# Patient Record
Sex: FEMALE | Race: WHITE | NOT HISPANIC OR LATINO | Employment: PART TIME | URBAN - METROPOLITAN AREA
[De-identification: names, ages, dates, MRNs, and addresses within clinical notes are randomized per-mention and may not be internally consistent; named-entity substitution may affect disease eponyms.]

---
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
## 2023-02-24 ENCOUNTER — OFFICE VISIT (OUTPATIENT)
Dept: ENDOCRINOLOGY | Facility: CLINIC | Age: 18
End: 2023-02-24
Payer: COMMERCIAL

## 2023-02-24 VITALS
SYSTOLIC BLOOD PRESSURE: 120 MMHG | WEIGHT: 221.56 LBS | HEART RATE: 86 BPM | HEIGHT: 66 IN | DIASTOLIC BLOOD PRESSURE: 83 MMHG | OXYGEN SATURATION: 99 % | BODY MASS INDEX: 35.61 KG/M2

## 2023-02-24 DIAGNOSIS — E28.2 PCOS (POLYCYSTIC OVARIAN SYNDROME): ICD-10-CM

## 2023-02-24 LAB
ALBUMIN SERPL-MCNC: 3.7 G/DL (ref 3.4–5)
ALP SERPL-CCNC: 90 U/L (ref 40–150)
ALT SERPL W P-5'-P-CCNC: 32 U/L (ref 0–50)
ANION GAP SERPL CALCULATED.3IONS-SCNC: 4 MMOL/L (ref 3–14)
AST SERPL W P-5'-P-CCNC: 12 U/L (ref 0–35)
BILIRUB SERPL-MCNC: 0.7 MG/DL (ref 0.2–1.3)
BUN SERPL-MCNC: 8 MG/DL (ref 7–30)
CALCIUM SERPL-MCNC: 9.8 MG/DL (ref 8.5–10.1)
CHLORIDE BLD-SCNC: 107 MMOL/L (ref 96–110)
CO2 SERPL-SCNC: 28 MMOL/L (ref 20–32)
CREAT SERPL-MCNC: 0.59 MG/DL (ref 0.5–1)
GFR SERPL CREATININE-BSD FRML MDRD: >90 ML/MIN/1.73M2
GLUCOSE BLD-MCNC: 94 MG/DL (ref 70–99)
HBA1C MFR BLD: 5.5 % (ref 0–5.6)
POTASSIUM BLD-SCNC: 4 MMOL/L (ref 3.4–5.3)
PROT SERPL-MCNC: 7.5 G/DL (ref 6.8–8.8)
SODIUM SERPL-SCNC: 139 MMOL/L (ref 133–144)

## 2023-02-24 PROCEDURE — 83036 HEMOGLOBIN GLYCOSYLATED A1C: CPT | Performed by: NURSE PRACTITIONER

## 2023-02-24 PROCEDURE — 80053 COMPREHEN METABOLIC PANEL: CPT | Performed by: NURSE PRACTITIONER

## 2023-02-24 PROCEDURE — 36415 COLL VENOUS BLD VENIPUNCTURE: CPT | Performed by: NURSE PRACTITIONER

## 2023-02-24 PROCEDURE — 99213 OFFICE O/P EST LOW 20 MIN: CPT | Performed by: NURSE PRACTITIONER

## 2023-02-24 RX ORDER — NORGESTIMATE AND ETHINYL ESTRADIOL 0.25-0.035
1 KIT ORAL DAILY
Qty: 63 TABLET | Refills: 3 | Status: SHIPPED | OUTPATIENT
Start: 2023-02-24 | End: 2023-05-25

## 2023-02-24 RX ORDER — NORGESTIMATE AND ETHINYL ESTRADIOL 0.25-0.035
1 KIT ORAL DAILY
Qty: 63 TABLET | Refills: 0 | Status: SHIPPED | OUTPATIENT
Start: 2023-02-24 | End: 2023-02-24

## 2023-02-24 NOTE — PATIENT INSTRUCTIONS
Thank you for choosing [facility]. It was a pleasure to see you for your office visit today.     If you have any questions or scheduling needs during regular office hours, please call: [phone]  If urgent concerns arise after hours, you can call [phone] and ask to speak to the pediatric specialist on call.   If you need to schedule Imaging/Radiology tests, please call: [phone]  [employer] messages are for routine communication and questions and are usually answered within 48-72 hours. If you have an urgent concern or require sooner response, please call us.  Outside lab and imaging results should be faxed to [phone].  If you go to a lab outside of [facility] we will not automatically get those results. You will need to ask to have them faxed.   You may receive a survey regarding your experience with the clinic today. We would appreciate your feedback.   We encourage to you make your follow-up today to ensure a timely appointment. If you are unable to do so please reach out to [phone] as soon as possible.       If you had any blood work, imaging or other tests completed today:  Normal test results will be mailed to your home address in a letter.  Abnormal results will be communicated to you via phone call/letter.  Please allow up to 1-2 weeks for processing and interpretation of most lab work.      I refilled your Ortho-cyclen for the year.  Labs today-A1c and electrolytes.  I recommend establishing care with adult ob/gyn to have them take over management of Honey's PCOS.

## 2023-02-24 NOTE — PROGRESS NOTES
Pediatric Endocrinology Follow-up Consultation    Patient: Honey Brar MRN# [id number]   YOB: 2005 Age: 17year 3month old   Date of Visit: Feb 24, 2023    Dear Dr. Maddie Lau:    I had the pleasure of seeing your patient, Honey Brar in the Pediatric Endocrinology Clinic, [facility], on Feb 24, 2023 for a follow-up consultation of PCOS.           Problem list:     Patient Active Problem List    Diagnosis Date Noted     Chronic thoracic back pain 04/13/2022     Priority: Medium     Ganglion cyst 04/13/2018     Priority: Medium     Elevated TSH 04/13/2018     Priority: Medium            HPI:   Honey is a 17 year old female with in follow up regarding PCOS.  Honey was last seen in endocrine clinic virtually on 5/11/2021.       Previous history is reviewed:  Honey had consultation with Dr. Merrill with dermatology for management of her severe acne 7/2020.  During this consultation concerns with areas of hirsutism on upper lip in addition to her acne were noted with recommendation to seek endocrine evaluation.  Honey had noticed this hair growth but has been relatively unconcerned by it.  Honey denies areas of hirsutism on chin, nipple area, or abdomen.  Her acne had shown much improvement with treatment prescribed by Dr. Merrill.  Honey underwent menarche at age 10.       Work up obtained after our initial consultation 8/18/2020 Honey showed a normal DHEA-S (ruling out concern for adrenal tumor), normal FSH and LH.  Honey's SHBG was low with a elevated free testosterone level.  Her abnormal biochemical markers  combined with hirsutism and  severe acne were concerning for PCOS.  Pelvic ultrasound performed was normal as was androstenedione level to rule out another marker of adrenal tumor.   Honey was recommended to initiate treatment with COCs and Ortho-Cyclen was prescribed.       Current history:  Honey reports being generally well since our last visit in 5/2021.  She has been off  OCPs for approximately the past 9-12 months (she is unclear exactly how long).  Her menses continue to be more regular even off treatment but she is interested in resuming use of Ortho-Cyclen today.  She just started her menses yesterday.   She denies excessive thirst or urination.   Honey denies abdominal pain, diarrhea, or constipation.  Facial hair growth has not been as significant.  Still following with dermatology.      I have reviewed the available past laboratory evaluations, imaging studies, and medical records available to me at this visit. I have reviewed the Honey's growth chart.  Growth is complete.  Weight is up.       History was obtained from patient, patient's mother and electronic health record.    History was obtained from patient, patient's mother and electronic health record.          Social History:     Social History     Social History Narrative    Graduated high school spring 2022.  She is living at home and attending the [employer].       Social history was reviewed.           Family History:     Family History   Problem Relation Age of Onset     Diabetes Type 2  Maternal Grandmother      Hypertension Mother      Depression Mother      Anxiety Disorder Mother        Family history was reviewed and is unchanged. Refer to the initial note.         Allergies:   No Known Allergies          Medications:     Current Outpatient Medications   Medication Sig Dispense Refill     clindamycin (CLINDAMAX) 1 % external gel Apply once daily to face, chest and back.  For additional refills, please schedule a follow-up appointment. 30 g 11     fluocinonide (LIDEX) 0.05 % external solution Apply once daily for 3 weeks, then use 3 times weekly as needed 60 mL 1     ketoconazole (NIZORAL) 2 % external shampoo Apply 5 to 10 mL to wet scalp, lather, leave on 3 to 5 minutes, and rinse; apply twice weekly for 2 to 4 weeks. 120 mL 1     norgestimate-ethinyl estradiol (ORTHO-CYCLEN) 0.25-35 MG-MCG tablet Take 1 tablet  "by mouth daily 63 tablet 3             Review of Systems:   Gen: Negative  Eye: Negative  ENT: Negative  Pulmonary:  Negative  Cardio: Negative  Gastrointestinal: Negative  Hematologic: Negative  Genitourinary: Negative  Musculoskeletal: Negative  Psychiatric: Negative  Neurologic: Negative  Skin: Negative  Endocrine: see HPI.            Physical Exam:   Blood pressure 120/83, pulse 86, height 1.675 m (5' 5.95\"), weight 100.5 kg (221 lb 9 oz), SpO2 99 %, not currently breastfeeding.  Blood pressure percentiles are not available for patients who are 18 years or older.  Height: 167.5 cm   74 %ile (Z= 0.65) based on CDC (Girls, 2-20 Years) Stature-for-age data based on Stature recorded on 2/24/2023.  Weight: 100.5 kg (actual weight), 99 %ile (Z= 2.22) based on CDC (Girls, 2-20 Years) weight-for-age data using vitals from 2/24/2023.  BMI: Body mass index is 35.82 kg/m . 98 %ile (Z= 1.97) based on CDC (Girls, 2-20 Years) BMI-for-age based on BMI available as of 2/24/2023.        Constitutional: awake, alert, cooperative, no apparent distress  Eyes: Lids and lashes normal, sclera clear, conjunctiva normal  ENT: Normocephalic, without obvious abnormality, external ears without lesions,   Neck: Supple, symmetrical, trachea midline, thyroid symmetric, not enlarged and no tenderness  Hematologic / Lymphatic: no cervical lymphadenopathy  Lungs: No increased work of breathing, clear to auscultation bilaterally with good air entry.  Cardiovascular: Regular rate and rhythm, no murmurs.  Abdomen: No scars,soft, non-distended, non-tender, no masses palpated, no hepatosplenomegaly  Musculoskeletal: There is no redness, warmth, or swelling of the joints.    Neurologic: Awake, alert, oriented to name, place and time.  Neuropsychiatric: normal  Skin: no lesions, mild acanthosis to posterior and anterior neck folds        Laboratory results:     Results for orders placed or performed in visit on 02/24/23   Hemoglobin A1c     Status: " Normal   Result Value Ref Range    Hemoglobin A1C 5.5 0.0 - 5.6 %   Comprehensive metabolic panel     Status: Normal   Result Value Ref Range    Sodium 139 133 - 144 mmol/L    Potassium 4.0 3.4 - 5.3 mmol/L    Chloride 107 96 - 110 mmol/L    Carbon Dioxide (CO2) 28 20 - 32 mmol/L    Anion Gap 4 3 - 14 mmol/L    Urea Nitrogen 8 7 - 30 mg/dL    Creatinine 0.59 0.50 - 1.00 mg/dL    Calcium 9.8 8.5 - 10.1 mg/dL    Glucose 94 70 - 99 mg/dL    Alkaline Phosphatase 90 40 - 150 U/L    AST 12 0 - 35 U/L    ALT 32 0 - 50 U/L    Protein Total 7.5 6.8 - 8.8 g/dL    Albumin 3.7 3.4 - 5.0 g/dL    Bilirubin Total 0.7 0.2 - 1.3 mg/dL    GFR Estimate >90 >60 mL/min/1.73m2            Assessment and Plan:   Honey is a 17 year old female with PCOS.    A1c screened today was normal.  Electrolytes including liver function and glucose were normal.  Ortho-cyclen was refilled.     Follow up with adult gynecology/ob is recommended in 1 year.      Orders Placed This Encounter   Procedures     Hemoglobin A1c     Comprehensive metabolic panel       Patient Instructions     Thank you for choosing [facility]. It was a pleasure to see you for your office visit today.     If you have any questions or scheduling needs during regular office hours, please call: [phone]  If urgent concerns arise after hours, you can call [phone] and ask to speak to the pediatric specialist on call.   If you need to schedule Imaging/Radiology tests, please call: [phone]  [employer] messages are for routine communication and questions and are usually answered within 48-72 hours. If you have an urgent concern or require sooner response, please call us.  Outside lab and imaging results should be faxed to [phone].  If you go to a lab outside of [facility] we will not automatically get those results. You will need to ask to have them faxed.   You may receive a survey regarding your experience with the clinic today. We would appreciate your  feedback.   We encourage to you make your follow-up today to ensure a timely appointment. If you are unable to do so please reach out to [phone] as soon as possible.       If you had any blood work, imaging or other tests completed today:  Normal test results will be mailed to your home address in a letter.  Abnormal results will be communicated to you via phone call/letter.  Please allow up to 1-2 weeks for processing and interpretation of most lab work.     1.  I refilled your Ortho-cyclen for the year.  2. Labs today-A1c and electrolytes.  3. I recommend establishing care with adult ob/gyn to have them take over management of Honey's PCOS.       Thank you for allowing me to participate in the care of your patient.  Please do not hesitate to call with questions or concerns.    Sincerely,    IMELDA Zapata, CNP  Pediatric Endocrinology  [facility]  [facility]  [phone]      25 minutes spent on the date of the encounter doing chart review, review of test results, interpretation of tests, patient visit, documentation and discussion with family       CC  Patient Care Team:  Maddie Lau MD (Inactive) as PCP - General (Pediatrics)  Queenie Merrill MD as Assigned Surgical Provider  Gala Fry MD as Assigned PCP  Amanda Forman APRN CNP as Nurse Practitioner (Pediatric Endocrinology)

## 2023-03-06 ENCOUNTER — MYC MEDICAL ADVICE (OUTPATIENT)
Dept: DERMATOLOGY | Facility: CLINIC | Age: 18
End: 2023-03-06

## 2023-03-06 NOTE — TELEPHONE ENCOUNTER
This appointment appears to have been scheduled as an in person Return appointment versus a telephone appointment.  Virtual visit facilitators may not have know to reach out to patient.      Reached out and rescheduled patient to an in-person appointment this Thursday 3/9/2023 at 1:00 pm.

## 2023-04-21 ENCOUNTER — VIRTUAL VISIT (OUTPATIENT)
Dept: DERMATOLOGY | Facility: CLINIC | Age: 18
End: 2023-04-21
Payer: COMMERCIAL

## 2023-04-21 ENCOUNTER — TELEPHONE (OUTPATIENT)
Dept: DERMATOLOGY | Facility: CLINIC | Age: 18
End: 2023-04-21

## 2023-04-21 DIAGNOSIS — L30.9 DERMATITIS: Primary | ICD-10-CM

## 2023-04-21 DIAGNOSIS — L21.0 DANDRUFF IN ADULT: ICD-10-CM

## 2023-04-21 DIAGNOSIS — L70.0 ACNE VULGARIS: ICD-10-CM

## 2023-04-21 DIAGNOSIS — R21 RASH: ICD-10-CM

## 2023-04-21 PROCEDURE — 99441 PR PHYSICIAN TELEPHONE EVALUATION 5-10 MIN: CPT | Mod: 93 | Performed by: DERMATOLOGY

## 2023-04-21 RX ORDER — FLUOCINONIDE TOPICAL SOLUTION USP, 0.05% 0.5 MG/ML
SOLUTION TOPICAL
Qty: 60 ML | Refills: 1 | Status: SHIPPED | OUTPATIENT
Start: 2023-04-21

## 2023-04-21 RX ORDER — KETOCONAZOLE 20 MG/ML
SHAMPOO TOPICAL
Qty: 120 ML | Refills: 1 | Status: SHIPPED | OUTPATIENT
Start: 2023-04-21

## 2023-04-21 RX ORDER — PIMECROLIMUS 10 MG/G
CREAM TOPICAL
Qty: 60 G | Refills: 0 | Status: SHIPPED | OUTPATIENT
Start: 2023-04-21

## 2023-04-21 RX ORDER — HYDROCORTISONE 25 MG/G
OINTMENT TOPICAL
Qty: 30 G | Refills: 1 | Status: SHIPPED | OUTPATIENT
Start: 2023-04-21

## 2023-04-21 RX ORDER — CLINDAMYCIN PHOSPHATE 10 MG/G
GEL TOPICAL
Qty: 30 G | Refills: 11 | Status: SHIPPED | OUTPATIENT
Start: 2023-04-21

## 2023-04-21 NOTE — PROGRESS NOTES
[facility] Dermatology Note  Encounter Date: Apr 21, 2023  Store-and-Forward and Telephone ([phone] ). Location of teledermatologist: [facility].  Start time: 4:05pm. End time: 4:13pm.    Dermatology Problem List:  Acne  -Current: BP wash, clindamycin gel and differin  -finished doxycycline (chest pain when on doxy not likely related)   Facial hair-t  erminal hair long jawline seen in this photo  -as above saw endo  -consider laser hair removal in the future     Family hx: Negative for skin cancer.     Mom present on phone  ____________________________________________     # Acne vulgaris -face, chest and back.Stable  Doxycycline induced chest pain in the past. Doing well today  - Continue Benzoyl peroxide wash - hold when irriated  - Continue clindamycin gel daily/differin (hold when rash appears)     # rash, posterior scalp- seb derm or psoriasis or dermatitis  - For scalp, start fluocinolone solution once nightly 1-2 times weekly   -ketoconazole shampoo-refilled     #eyelid dermatitis  Pimecrolimus, hydrocortisone 2.5%, alternate. If not active, do not use creams. Reviewed black box warning, not pregnant  -referral dermatitis  Procedures Performed:    None    Follow-up: 3 months photos and phone, see Dr. Suggs, call if not improving within 1 week    Staff:     Queenie Merrill MD    Department of Dermatology  [facility]: Phone: [phone], Fax:[phone]  [facility]: Phone: [phone], Fax: [phone]      ____________________________________________    CC: Follow Up (Acne)    HPI:  Ms. Honey Brar is a(n) 17 year old female who presents today as a return patient for acne. Wants refills    Rash, itchy, on the neck and also the eyelids, rough and can bleed     Not pregnant or Breastfeeding    No hx of lymphoma    New rash on  neck    Is using differin but rash not there    Rash present on eyes for months    Patient is otherwise feeling well, without additional skin concerns.    Labs Reviewed:  NA    Physical Exam:  Vitals: There were no vitals taken for this visit.  SKIN: Teledermatology photos were reviewed; image quality and interpretability: acceptable. Image date:  Taken within last week  - scaly patches on the periorbital region, no neck photos, pt welcome to send  - No other lesions of concern on areas examined.     Medications:  Current Outpatient Medications   Medication     clindamycin (CLINDAMAX) 1 % external gel     fluocinonide (LIDEX) 0.05 % external solution     ketoconazole (NIZORAL) 2 % external shampoo     norgestimate-ethinyl estradiol (ORTHO-CYCLEN) 0.25-35 MG-MCG tablet     No current facility-administered medications for this visit.      Past Medical/Surgical History:   Patient Active Problem List   Diagnosis     Ganglion cyst     Elevated TSH     Chronic thoracic back pain     Past Medical History:   Diagnosis Date     Acne      Hearing loss     bilateral hearing aids       CC No referring provider defined for this encounter. on close of this encounter.

## 2023-04-21 NOTE — TELEPHONE ENCOUNTER
[facility] Call Center    Phone Message    May a detailed message be left on voicemail: no     Reason for Call: Other: Pt's mother calling today for the Telephone visit for Dr. Merrill- her appt was at 11:30.  It is now 12:13pm/ no answer on chart/priority line     Action Taken: Other: [facility]    Travel Screening: Not Applicable

## 2023-04-21 NOTE — PATIENT INSTRUCTIONS
[facility] Dermatology Visit    Thank you for allowing us to participate in your care. Your findings, instructions and follow-up plan are as follows:     Vaseline petroleum jelly or aquaphor ointment    Hydrocortisone cream for 1 week, then the pimecrolimus for 1 week, repeat cycle as needed    Patch Testing Referral:    Allergy/patch testing with Dr. Melvin Suggs.      There is currently a 2-3 month wait for the initial consultation. If you qualify for patch testing then you will require a series of 3 appointments to complete the patch testing process.    Dermato-allergology clinics on the 4th floor:  [facility], [address], [address], [place], [place], [address]  Phone  Allergy is [phone]             When should I call my doctor?  If you are worsening or not improving, please, contact us or seek urgent care as noted below.     Who should I call with questions (adults)?  [facility] (adult and pediatric): [phone]  [facility] (adult): [phone]  For urgent needs outside of business hours call the [facility] at [phone] and ask for the dermatology resident on call  If this is a medical emergency and you are unable to reach an ER, Call [phone]    Who should I call with questions (pediatric)?  [facility]- Pediatric Dermatology  Dr. Molly Onofre, Dr. Sarwat Gorman, Dr. Evi Gómez, Hallie Mendoza, PA  Dr. Fannie Valladares, Dr. Sara William & Dr. Jian Seo  Non Urgent  Nurse Triage Line; [phone]- Rosie and Kathleen VINSON Care Coordinators   Natasha (/Complex ) [phone]    If you need a prescription refill, please contact your pharmacy. Refills are approved or denied by our physicians during normal business hours, Monday through Fridays  Per office policy, refills will not  be granted if you have not been seen within the past year (or sooner depending on your child's condition).    Scheduling Information:  Pediatric Appointment Scheduling and Call Center [phone]  Radiology Scheduling- [phone]  Sedation Unit Scheduling- [phone]  [place] Scheduling- General [phone]; Pediatric Dermatology [phone]  Main  Services: [phone]  Luxembourger: [phone]  Greenlandic: [phone]  Hmong/Scottish/Lithuanian: [phone]  Preadmission Nursing Department Fax Number: [phone] (fax all pre-operative paperwork to this number)    For urgent matters arising during evenings, weekends, or holidays that cannot wait for normal business hours please call [phone] and ask for the dermatology resident on call to be paged.

## 2023-04-21 NOTE — NURSING NOTE
Chief Complaint   Patient presents with     Follow Up     Acne     Teledermatology Nurse Call Patients:     Are you in the [facility] at the time of the encounter? yes    Today's visit will be billed to you and your insurance.    A teledermatology visit is not as thorough as an in-person visit and the quality of the photograph sent may not be of the same quality as that taken by the dermatology clinic.    Shelby Kocher

## 2023-05-06 ENCOUNTER — HEALTH MAINTENANCE LETTER (OUTPATIENT)
Age: 18
End: 2023-05-06

## 2023-05-25 DIAGNOSIS — E28.2 PCOS (POLYCYSTIC OVARIAN SYNDROME): ICD-10-CM

## 2023-05-25 RX ORDER — NORGESTIMATE AND ETHINYL ESTRADIOL 0.25-0.035
1 KIT ORAL DAILY
Qty: 63 TABLET | Refills: 3 | Status: SHIPPED | OUTPATIENT
Start: 2023-05-25 | End: 2023-09-06

## 2023-05-25 NOTE — TELEPHONE ENCOUNTER
Faxed refill request received from: [facility]  Medication Requested: norgestimate-ethinyl estradiol (ORTHO-CYCLEN) 0.25-35 MG-MCG tablet  Directions:Take 1 tablet by mouth daily - Oral  Quantity:63 tablets  Last Office Visit: 02/24/2023  Next Appointment Scheduled for: No future appointments scheduled at this time.  Last refill: 02/24/2023    CATHERINE Thompson

## 2023-05-25 NOTE — TELEPHONE ENCOUNTER
Last office visit 2/24/23, follow up due in a year.  Ortho-Cyclen refill send to [facility].    Arlene Keys RN, BSN, CPN  Care Coordinator Pediatric Cardiology and Endocrinology  [facility]  Phone: [phone]  Fax: [phone]

## 2023-08-12 NOTE — TELEPHONE ENCOUNTER
FUTURE VISIT INFORMATION      FUTURE VISIT INFORMATION:  Date: 11.8.23  Time: 11:00  Location: [facility]  REFERRAL INFORMATION:  Referring provider:  [facility]  Referring providers clinic:  Derm  Reason for visit/diagnosis  Patch Testing for Dermatitis - Referred by Queenie Merrill MD in  [facility] - Recs in Epic - no outside Recs - per Pt's alyssa Ngo      RECORDS REQUESTED FROM:       Clinic name Comments Records Status Imaging Status   Derm 4.21.23, 4.19.22, 12.30.20  [facility] Epic Epic

## 2023-08-31 NOTE — PROGRESS NOTES
SUBJECTIVE:   CC: Honey is an 17 year old who presents for preventive health visit.     Healthy Habits:     Getting at least 3 servings of Calcium per day:  Yes    Bi-annual eye exam:  NO    Dental care twice a year:  Yes    Sleep apnea or symptoms of sleep apnea:  None    Diet:  Regular (no restrictions)    Frequency of exercise:  2-3 days/week    Duration of exercise:  15-30 minutes    Taking medications regularly:  Yes    Medication side effects:  None    Additional concerns today:  No    Patient arrived for Annual Physical, not due for PAP.     Have you ever done Advance Care Planning? (For example, a Health Directive, POLST, or a discussion with a medical provider or your loved ones about your wishes):     Social History     Tobacco Use    Smoking status: Never    Smokeless tobacco: Never    Tobacco comments:     non smoking household   Substance Use Topics    Alcohol use: No             9/5/2023    10:47 AM   Alcohol Use   Prescreen: >3 drinks/day or >7 drinks/week? Not Applicable     Reviewed orders with patient.  Reviewed health maintenance and updated orders accordingly -   Lab work is in process  Labs reviewed in EPIC  Current Outpatient Medications   Medication Sig Dispense Refill    clindamycin (CLINDAMAX) 1 % external gel Apply once daily to face, chest and back.  For additional refills, please schedule a follow-up appointment. 30 g 11    fluocinonide (LIDEX) 0.05 % external solution Apply once daily for 3 weeks, then use 3 times weekly as needed 60 mL 1    hydrocortisone 2.5 % ointment Apply twice daily for 1 week, take 1 week off, repeat if needed 30 g 1    ketoconazole (NIZORAL) 2 % external shampoo Apply 5 to 10 mL to wet scalp, lather, leave on 3 to 5 minutes, and rinse; apply twice weekly for 2 to 4 weeks. 120 mL 1    norgestimate-ethinyl estradiol (ORTHO-CYCLEN) 0.25-35 MG-MCG tablet Take 1 tablet by mouth daily 63 tablet 3    pimecrolimus (ELIDEL) 1 % external cream Apply twice daily as  needed 60 g 0     No Known Allergies    Breast Cancer Screening:        History of abnormal Pap smear: NO - under age 21, PAP not appropriate for age     Reviewed and updated as needed this visit by clinical staff   Tobacco  Allergies               Reviewed and updated as needed this visit by Provider                   Here today with mom for physical.  Needs to have refill of birth control pills.  History of PCOS.  Periods were regular piror to starting them Flow was average. Would get some cramping before or after period. Does get some bloating. Did have acne prior to starting pills. Is also using the creams to help decrease acne.  Is not sexually active, has never been.  No concerns for sexually transmitted infections.  LMP: 2 weeks ago    Right hand thumb and palm will get sore, and stiff. Pain gets less when using hand. No injury that is aware of. Does get some tingling to arm at times. Is right handed. Does not have any swelling. No home over the counter treatments. Has this pain daily for the last couple of weeks. Will feel like is weaker on that side. 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     Having lower back pain that will hurt when goes to get up.  Reports history of scoliosis.  No injury that is aware of. Does have to do repeative motions and movements. No pain into legs or buttocks.  No home over-the-counter medications.  Has never been to physical therapy.  Had imaging of middle back March 2022    Last Pap: N/A  Last mammogram: Never, no famly history   Last BMD: N/A  Last Colonoscopy: never, no family history.   Last eye exam: Sometime ago  Last dental exam: Every 6 months  Last tetanus vaccine: 2015  Last influenza vaccine: Plans to get here today  Last shingles vaccine: N/A  Last pneumonia vaccine: N/A  Last COVID vaccine: Has had both doses  Last COVID booster: Plans to do here today  Hep C screen:  HIV screen:  AAA screen (age 65-78 with  "smoking hx): N/A  IVD (HTN, Hyperlipid, Smoking): N/A  Lung CA screening (50-77, 20 pk smoking hx) Quit within 15 years: N/A      Review of Systems   Constitutional:  Negative for chills and fever.   HENT:  Positive for hearing loss. Negative for congestion, ear pain and sore throat.    Eyes:  Negative for pain and visual disturbance.   Respiratory:  Negative for cough and shortness of breath.    Cardiovascular:  Negative for palpitations and peripheral edema.   Gastrointestinal:  Negative for abdominal pain, constipation, diarrhea, heartburn, hematochezia and nausea.   Breasts:  Negative for tenderness, breast mass and discharge.   Genitourinary:  Negative for dysuria, frequency, genital sores, hematuria, pelvic pain, urgency, vaginal bleeding and vaginal discharge.   Musculoskeletal:  Positive for arthralgias (right hand, thumb pain) and back pain (lower). Negative for joint swelling.   Skin:  Negative for rash.   Neurological:  Negative for dizziness, weakness, headaches and paresthesias.   Psychiatric/Behavioral:  Negative for mood changes. The patient is not nervous/anxious.         OBJECTIVE:   /82 (BP Location: Right arm, Patient Position: Chair, Cuff Size: Adult Large)   Pulse 74   Temp 98  F (36.7  C) (Tympanic)   Resp 16   Ht 1.67 m (5' 5.75\")   Wt 99.8 kg (220 lb)   SpO2 100%   BMI 35.78 kg/m    Physical Exam  Constitutional:       Appearance: Normal appearance. She is well-developed.   HENT:      Head: Normocephalic and atraumatic.      Right Ear: Tympanic membrane and external ear normal. No middle ear effusion.      Left Ear: Tympanic membrane and external ear normal.  No middle ear effusion.      Nose: No mucosal edema.   Neck:      Thyroid: No thyromegaly.      Vascular: No carotid bruit.   Cardiovascular:      Rate and Rhythm: Normal rate and regular rhythm.      Heart sounds: Normal heart sounds.   Pulmonary:      Effort: Pulmonary effort is normal.      Breath sounds: Normal breath " "sounds.   Abdominal:      General: Bowel sounds are normal.      Palpations: Abdomen is soft.   Musculoskeletal:      Thoracic back: Tenderness present. No bony tenderness. Normal range of motion. Scoliosis present.      Lumbar back: Tenderness present. No bony tenderness. Normal range of motion.        Back:       Comments: Tenderness with palpation over right thumb MCP joint.  No erythema or warmth to touch.  No decreased range of motion   Skin:     General: Skin is warm and dry.   Neurological:      Mental Status: She is alert.   Psychiatric:         Behavior: Behavior normal.           ASSESSMENT/PLAN:   Routine general medical examination at a health care facility  Screening guidelines reviewed.     PCOS (polycystic ovarian syndrome)  Refill of pills given.  We will arrange for fasting lab appointment.  Follow-up in 1 year  - norgestimate-ethinyl estradiol (ORTHO-CYCLEN) 0.25-35 MG-MCG tablet; Take 1 tablet by mouth daily  - Basic metabolic panel; Future  - Hemoglobin A1c; Future  - Lipid panel reflex to direct LDL Fasting; Future  - TSH with free T4 reflex; Future    Chronic bilateral low back pain without sciatica  Previous x-rays reviewed.  Recommend physical therapy  - Physical Therapy Referral; Future    Pain of left hand  We will obtain imaging here today.  We will check labs.  Full plan will depend on outcome.  - CRP inflammation; Future  - Erythrocyte sedimentation rate auto; Future  - Rheumatoid factor; Future  - Cyclic Citrullinated Peptide Antibody IgG; Future    Adolescent idiopathic scoliosis of thoracolumbar region  Previous imaging reviewed.  Recommend physical therapy.        COUNSELING:  Reviewed preventive health counseling, as reflected in patient instructions      BMI:   Estimated body mass index is 35.78 kg/m  as calculated from the following:    Height as of this encounter: 1.67 m (5' 5.75\").    Weight as of this encounter: 99.8 kg (220 lb).   Weight management plan: Discussed healthy diet " and exercise guidelines      She reports that she has never smoked. She has never used smokeless tobacco.          IMELDA Velasquez CNP  [facility]

## 2023-09-05 ASSESSMENT — ENCOUNTER SYMPTOMS
NERVOUS/ANXIOUS: 0
JOINT SWELLING: 0
DIZZINESS: 0
PALPITATIONS: 0
CONSTIPATION: 0
PARESTHESIAS: 0
HEARTBURN: 0
MYALGIAS: 1
ARTHRALGIAS: 0
COUGH: 0
EYE PAIN: 0
HEADACHES: 0
CHILLS: 0
DIARRHEA: 0
FEVER: 0
WEAKNESS: 0
SORE THROAT: 0
BREAST MASS: 0
NAUSEA: 0
SHORTNESS OF BREATH: 0
HEMATOCHEZIA: 0
HEMATURIA: 0
FREQUENCY: 0
ABDOMINAL PAIN: 0
DYSURIA: 0

## 2023-09-05 ASSESSMENT — ASTHMA QUESTIONNAIRES: ACT_TOTALSCORE: 22

## 2023-09-06 ENCOUNTER — OFFICE VISIT (OUTPATIENT)
Dept: FAMILY MEDICINE | Facility: CLINIC | Age: 18
End: 2023-09-06
Payer: COMMERCIAL

## 2023-09-06 ENCOUNTER — TELEPHONE (OUTPATIENT)
Dept: FAMILY MEDICINE | Facility: CLINIC | Age: 18
End: 2023-09-06

## 2023-09-06 ENCOUNTER — ANCILLARY PROCEDURE (OUTPATIENT)
Dept: GENERAL RADIOLOGY | Facility: CLINIC | Age: 18
End: 2023-09-06
Attending: NURSE PRACTITIONER
Payer: COMMERCIAL

## 2023-09-06 VITALS
BODY MASS INDEX: 35.36 KG/M2 | SYSTOLIC BLOOD PRESSURE: 124 MMHG | OXYGEN SATURATION: 100 % | RESPIRATION RATE: 16 BRPM | HEIGHT: 66 IN | HEART RATE: 74 BPM | DIASTOLIC BLOOD PRESSURE: 82 MMHG | WEIGHT: 220 LBS | TEMPERATURE: 98 F

## 2023-09-06 DIAGNOSIS — M79.642 PAIN OF LEFT HAND: ICD-10-CM

## 2023-09-06 DIAGNOSIS — G89.29 CHRONIC BILATERAL LOW BACK PAIN WITHOUT SCIATICA: ICD-10-CM

## 2023-09-06 DIAGNOSIS — Z00.00 ROUTINE GENERAL MEDICAL EXAMINATION AT A HEALTH CARE FACILITY: Primary | ICD-10-CM

## 2023-09-06 DIAGNOSIS — M41.125 ADOLESCENT IDIOPATHIC SCOLIOSIS OF THORACOLUMBAR REGION: ICD-10-CM

## 2023-09-06 DIAGNOSIS — E28.2 PCOS (POLYCYSTIC OVARIAN SYNDROME): ICD-10-CM

## 2023-09-06 DIAGNOSIS — M54.50 CHRONIC BILATERAL LOW BACK PAIN WITHOUT SCIATICA: ICD-10-CM

## 2023-09-06 PROCEDURE — 90686 IIV4 VACC NO PRSV 0.5 ML IM: CPT | Performed by: NURSE PRACTITIONER

## 2023-09-06 PROCEDURE — 99395 PREV VISIT EST AGE 18-39: CPT | Mod: 25 | Performed by: NURSE PRACTITIONER

## 2023-09-06 PROCEDURE — 73130 X-RAY EXAM OF HAND: CPT | Mod: TC | Performed by: RADIOLOGY

## 2023-09-06 PROCEDURE — 91312 COVID-19 BIVALENT 12+ (PFIZER): CPT | Performed by: NURSE PRACTITIONER

## 2023-09-06 PROCEDURE — 0121A COVID-19 BIVALENT 12+ (PFIZER): CPT | Performed by: NURSE PRACTITIONER

## 2023-09-06 PROCEDURE — 90471 IMMUNIZATION ADMIN: CPT | Performed by: NURSE PRACTITIONER

## 2023-09-06 PROCEDURE — 99214 OFFICE O/P EST MOD 30 MIN: CPT | Mod: 25 | Performed by: NURSE PRACTITIONER

## 2023-09-06 RX ORDER — NORGESTIMATE AND ETHINYL ESTRADIOL 0.25-0.035
1 KIT ORAL DAILY
Qty: 63 TABLET | Refills: 3 | Status: SHIPPED | OUTPATIENT
Start: 2023-09-06 | End: 2023-09-06

## 2023-09-06 RX ORDER — NORGESTIMATE AND ETHINYL ESTRADIOL 0.25-0.035
1 KIT ORAL DAILY
Qty: 84 TABLET | Refills: 3 | Status: SHIPPED | OUTPATIENT
Start: 2023-09-06

## 2023-09-06 ASSESSMENT — ENCOUNTER SYMPTOMS
ABDOMINAL PAIN: 0
DYSURIA: 0
BACK PAIN: 1
NAUSEA: 0
FREQUENCY: 0
DIZZINESS: 0
FEVER: 0
NERVOUS/ANXIOUS: 0
JOINT SWELLING: 0
HEADACHES: 0
SORE THROAT: 0
SHORTNESS OF BREATH: 0
EYE PAIN: 0
WEAKNESS: 0
BREAST MASS: 0
PALPITATIONS: 0
CONSTIPATION: 0
CHILLS: 0
ARTHRALGIAS: 1
COUGH: 0
PARESTHESIAS: 0
DIARRHEA: 0
HEARTBURN: 0
HEMATURIA: 0
HEMATOCHEZIA: 0

## 2023-09-06 NOTE — TELEPHONE ENCOUNTER
[facility] calling regarding prescription for norgestimate-ethinyl estradiol (ORTHO-CYCLEN) 0.25-35 MG-MCG tablet. Package come in 28. Is patient only to take active pills and skip placebo pills? Please advise.

## 2023-09-07 ENCOUNTER — LAB (OUTPATIENT)
Dept: LAB | Facility: CLINIC | Age: 18
End: 2023-09-07
Payer: COMMERCIAL

## 2023-09-07 DIAGNOSIS — M79.642 PAIN OF LEFT HAND: ICD-10-CM

## 2023-09-07 DIAGNOSIS — E28.2 PCOS (POLYCYSTIC OVARIAN SYNDROME): ICD-10-CM

## 2023-09-07 LAB
ANION GAP SERPL CALCULATED.3IONS-SCNC: 10 MMOL/L (ref 7–15)
BUN SERPL-MCNC: 8.3 MG/DL (ref 6–20)
CALCIUM SERPL-MCNC: 9.3 MG/DL (ref 8.6–10)
CHLORIDE SERPL-SCNC: 105 MMOL/L (ref 98–107)
CHOLEST SERPL-MCNC: 171 MG/DL
CREAT SERPL-MCNC: 0.7 MG/DL (ref 0.51–0.95)
CRP SERPL-MCNC: 8.57 MG/L
DEPRECATED HCO3 PLAS-SCNC: 26 MMOL/L (ref 22–29)
EGFRCR SERPLBLD CKD-EPI 2021: >90 ML/MIN/1.73M2
ERYTHROCYTE [SEDIMENTATION RATE] IN BLOOD BY WESTERGREN METHOD: 12 MM/HR (ref 0–20)
GLUCOSE SERPL-MCNC: 97 MG/DL (ref 70–99)
HBA1C MFR BLD: 5.2 % (ref 0–5.6)
HDLC SERPL-MCNC: 40 MG/DL
LDLC SERPL CALC-MCNC: 108 MG/DL
NONHDLC SERPL-MCNC: 131 MG/DL
POTASSIUM SERPL-SCNC: 4.6 MMOL/L (ref 3.4–5.3)
SODIUM SERPL-SCNC: 141 MMOL/L (ref 136–145)
TRIGL SERPL-MCNC: 113 MG/DL
TSH SERPL DL<=0.005 MIU/L-ACNC: 2.32 UIU/ML (ref 0.5–4.3)

## 2023-09-07 PROCEDURE — 83036 HEMOGLOBIN GLYCOSYLATED A1C: CPT

## 2023-09-07 PROCEDURE — 85652 RBC SED RATE AUTOMATED: CPT

## 2023-09-07 PROCEDURE — 86431 RHEUMATOID FACTOR QUANT: CPT

## 2023-09-07 PROCEDURE — 80048 BASIC METABOLIC PNL TOTAL CA: CPT

## 2023-09-07 PROCEDURE — 36415 COLL VENOUS BLD VENIPUNCTURE: CPT

## 2023-09-07 PROCEDURE — 86200 CCP ANTIBODY: CPT

## 2023-09-07 PROCEDURE — 80061 LIPID PANEL: CPT

## 2023-09-07 PROCEDURE — 84443 ASSAY THYROID STIM HORMONE: CPT

## 2023-09-07 PROCEDURE — 86140 C-REACTIVE PROTEIN: CPT

## 2023-09-08 LAB
CCP AB SER IA-ACNC: 0.8 U/ML
RHEUMATOID FACT SER NEPH-ACNC: 9 IU/ML

## 2023-10-30 ENCOUNTER — MYC MEDICAL ADVICE (OUTPATIENT)
Dept: DERMATOLOGY | Facility: CLINIC | Age: 18
End: 2023-10-30
Payer: COMMERCIAL

## 2023-10-31 DIAGNOSIS — E28.2 PCOS (POLYCYSTIC OVARIAN SYNDROME): ICD-10-CM

## 2023-10-31 RX ORDER — NORGESTIMATE AND ETHINYL ESTRADIOL 0.25-0.035
1 KIT ORAL DAILY
Qty: 84 TABLET | Refills: 3 | OUTPATIENT
Start: 2023-10-31

## 2023-11-08 ENCOUNTER — PRE VISIT (OUTPATIENT)
Dept: ALLERGY | Facility: CLINIC | Age: 18
End: 2023-11-08

## 2024-02-06 NOTE — LETTER
12/3/2020         RE: Honey Brar  [address] [facility] [place] [address]        Dear Colleague,    Thank you for referring your patient, Honey Brar, to the [facility]. Please see a copy of my visit note below.    [facility] Dermatology Record:  Store and Forward and Telephone [phone]        Dermatology Problem List:  Acne  -Current: BP wash, clindamycin gel and differin  -finished doxycycline    Encounter Date: Dec 3, 2020    CC:   Chief Complaint   Patient presents with     Derm Problem     Acne f/u        History of Present Illness:  Honey Brar is a 15 year old female who presents for follow up of acne. The patient reports she saw endo. Did not yet start OCP, was supposed to start Sunday after last period.    Last period started    Mom on phone    Patient reports chest pain that started after 1- 2 months after starting doxycycline. Unsure of related. No leg swelling.  Last happened last week, now gone    Not picking  face      ROS: Patient is generally feeling well today, no recent cold    Physical Examination:  General: Well-appearig appropriately-developed individual.  Skin: Focused examination including was performed.   erythematous papules, groin  Fewer acneiform macules on cheeks when compare to prior photos, still present  Few acneiform papules on chest  Labs:  NA  Past Medical History:   Patient Active Problem List   Diagnosis     Exercise-induced asthma     Ganglion cyst     Elevated TSH     Past Medical History:   Diagnosis Date     Hearing loss     bilateral hearing aids     Uncomplicated asthma      Past Surgical History:   Procedure Laterality Date     ADENOIDECTOMY       AS ASPIRATION &/OR INJECTION GANGLION CYST, ANY N/A      DENTAL SURGERY N/A      ENT SURGERY       EXCISE GANGLION WRIST Right 4/20/2018    Procedure: EXCISE GANGLION WRIST;  Right dorsal ganglion cyst excision;  Surgeon: Emile Monroy MD;  Location: [facility] OR     TONSILLECTOMY       TYMPANOPLASTY  N/A        Social History:  Patient reports that she has never smoked. She has never used smokeless tobacco. She reports that she does not drink alcohol or use drugs.    Family History:  Family History   Problem Relation Age of Onset     Diabetes Type 2  Maternal Grandmother        Medications:  Current Outpatient Medications   Medication     clindamycin (CLINDAMAX) 1 % external gel     doxycycline monohydrate (MONODOX) 100 MG capsule     norgestimate-ethinyl estradiol (ORTHO-CYCLEN) 0.25-35 MG-MCG tablet     No current facility-administered medications for this visit.           No Known Allergies        Impression and Recommendations (Patient Counseled on the Following):  1. Acne vulgaris, inflammatory, moderate, face, chest and back- improved on orals and topicals, started oral OCP from endo 10/2020 but not yet started by patient  -Conintue BP   -Continue clindamycin    -Continue differin  qhs  - CeraVE product area okay but go to every other day or switch to neutrogena hydroboost  -saw endo, started on combined OCP when PCP gives okay     2. Facial hair-terminal hair long jawline seen in this photo  -as above saw endo  -consider laser hair removal in the future     3 Mother present for visit, reports facial picking  -recommend she avoid this, if becomes habit will consider selective serotonin reuptake inhibitor    4. Chest pain intermittent, occurred when on doxycycline but not a burning session, more temporary tightness, last happened last week. Gone now Reviewed with mother and patient this needs work up  -recommend pcp evaluation, sent message to PCP  -declines ER  - reviewed do not start OCP until cleared by PCP      Follow-up:   Follow-up with dermatology in approximately 3 months and photos and phone. Earlier for new or changing lesions or rash.      Staff only    Queenie Merrill MD    Department of Dermatology  [facility]: Phone:  [phone], Fax:[phone]  [facility]: Phone: [phone], Fax: [phone]      _____________________________________________________________________________    Teledermatology information:  - Location of patient: [place]  - Patient presented as: return  - Location of teledermatologist:  [facility] )  - Image quality and interpretability: acceptable  - Physician has received verbal consent for a Video/Photos Visit from the patient? YES  - In-person dermatology visit recommendation: yes - for physician visit with PCP  - Date of images: yesterday  - Service start time:1:11pm  - Service end time:1:18pm  - Date of report: 12/3/2020         Again, thank you for allowing me to participate in the care of your patient.        Sincerely,        Queenie Merrill MD     this RN speaks Grenadian

## 2024-11-30 ENCOUNTER — HEALTH MAINTENANCE LETTER (OUTPATIENT)
Age: 19
End: 2024-11-30

## 2025-06-04 NOTE — ADDENDUM NOTE
Addended by: MONAE MEDINA on: 6/8/2020 09:42 AM     Modules accepted: Orders     Patient returned call and was provided with provider message above. Patient is aware and had no further questions.

## (undated) DEVICE — SU MONOCRYL 3-0 SH 27" Y316H

## (undated) DEVICE — DRSG STERI STRIP 1/2X4" R1547

## (undated) DEVICE — GLOVE PROTEXIS W/NEU-THERA 7.5  2D73TE75

## (undated) DEVICE — GLOVE PROTEXIS POWDER FREE 8.0 ORTHOPEDIC 2D73ET80

## (undated) DEVICE — NDL 19GA 1.5"

## (undated) DEVICE — PACK HAND WRIST SOP15HWFSP

## (undated) DEVICE — ESU GROUND PAD ADULT W/CORD E7507

## (undated) DEVICE — BNDG ELASTIC 2"X5YDS UNSTERILE 6611-20

## (undated) DEVICE — SOL WATER IRRIG 1000ML BOTTLE 07139-09

## (undated) DEVICE — DRAPE STERI TOWEL SM 1000

## (undated) DEVICE — SU PROLENE 4-0 PS-2 18" 8682G

## (undated) DEVICE — GLOVE PROTEXIS BLUE W/NEU-THERA 7.5  2D73EB75

## (undated) RX ORDER — ONDANSETRON 2 MG/ML
INJECTION INTRAMUSCULAR; INTRAVENOUS
Status: DISPENSED
Start: 2018-04-20

## (undated) RX ORDER — DEXAMETHASONE SODIUM PHOSPHATE 4 MG/ML
INJECTION, SOLUTION INTRA-ARTICULAR; INTRALESIONAL; INTRAMUSCULAR; INTRAVENOUS; SOFT TISSUE
Status: DISPENSED
Start: 2018-04-20

## (undated) RX ORDER — LIDOCAINE HYDROCHLORIDE 10 MG/ML
INJECTION, SOLUTION EPIDURAL; INFILTRATION; INTRACAUDAL; PERINEURAL
Status: DISPENSED
Start: 2018-04-20

## (undated) RX ORDER — LIDOCAINE HYDROCHLORIDE 5 MG/ML
INJECTION, SOLUTION INFILTRATION; PERINEURAL
Status: DISPENSED
Start: 2018-04-20

## (undated) RX ORDER — FENTANYL CITRATE 50 UG/ML
INJECTION, SOLUTION INTRAMUSCULAR; INTRAVENOUS
Status: DISPENSED
Start: 2018-04-20

## (undated) RX ORDER — ACETAMINOPHEN 325 MG/1
TABLET ORAL
Status: DISPENSED
Start: 2018-04-20

## (undated) RX ORDER — KETOROLAC TROMETHAMINE 30 MG/ML
INJECTION, SOLUTION INTRAMUSCULAR; INTRAVENOUS
Status: DISPENSED
Start: 2018-04-20